# Patient Record
Sex: FEMALE | Race: WHITE | Employment: UNEMPLOYED | URBAN - METROPOLITAN AREA
[De-identification: names, ages, dates, MRNs, and addresses within clinical notes are randomized per-mention and may not be internally consistent; named-entity substitution may affect disease eponyms.]

---

## 2018-07-13 NOTE — LETTER
Date: 9/28/2021    Patient Name: Marylene Cooks          To Whom it may concern: This letter has been written at the patient's request. The above patient was seen at the [facility] for treatment of a medical condition.     Ileana Marie no shortness of breath.

## 2021-09-27 ENCOUNTER — OFFICE VISIT (OUTPATIENT)
Dept: FAMILY MEDICINE CLINIC | Facility: CLINIC | Age: 61
End: 2021-09-27
Payer: COMMERCIAL

## 2021-09-27 VITALS
TEMPERATURE: 99 F | SYSTOLIC BLOOD PRESSURE: 124 MMHG | WEIGHT: 116 LBS | HEIGHT: 65 IN | RESPIRATION RATE: 16 BRPM | BODY MASS INDEX: 19.33 KG/M2 | DIASTOLIC BLOOD PRESSURE: 42 MMHG | HEART RATE: 81 BPM

## 2021-09-27 DIAGNOSIS — K08.9 POOR DENTITION: ICD-10-CM

## 2021-09-27 DIAGNOSIS — K21.00 GASTROESOPHAGEAL REFLUX DISEASE WITH ESOPHAGITIS WITHOUT HEMORRHAGE: Primary | ICD-10-CM

## 2021-09-27 DIAGNOSIS — F33.1 MODERATE EPISODE OF RECURRENT MAJOR DEPRESSIVE DISORDER (HCC): ICD-10-CM

## 2021-09-27 DIAGNOSIS — Z86.010 PERSONAL HISTORY OF COLONIC POLYPS: ICD-10-CM

## 2021-09-27 DIAGNOSIS — F41.1 GENERALIZED ANXIETY DISORDER: ICD-10-CM

## 2021-09-27 PROBLEM — R31.1 BENIGN ESSENTIAL MICROSCOPIC HEMATURIA: Status: ACTIVE | Noted: 2018-02-03

## 2021-09-27 PROBLEM — R63.4 WEIGHT LOSS: Status: ACTIVE | Noted: 2019-10-28

## 2021-09-27 PROBLEM — F33.2 SEVERE EPISODE OF RECURRENT MAJOR DEPRESSIVE DISORDER, WITHOUT PSYCHOTIC FEATURES (HCC): Status: ACTIVE | Noted: 2017-12-30

## 2021-09-27 PROBLEM — J34.89 NASAL OBSTRUCTION: Status: ACTIVE | Noted: 2017-12-16

## 2021-09-27 PROBLEM — J34.2 NASAL SEPTAL DEVIATION: Status: ACTIVE | Noted: 2017-12-16

## 2021-09-27 PROBLEM — M54.2 NECK PAIN: Status: ACTIVE | Noted: 2017-12-16

## 2021-09-27 PROBLEM — M81.6 LOCALIZED OSTEOPOROSIS WITHOUT CURRENT PATHOLOGICAL FRACTURE: Status: ACTIVE | Noted: 2017-12-05

## 2021-09-27 PROCEDURE — 96127 BRIEF EMOTIONAL/BEHAV ASSMT: CPT | Performed by: STUDENT IN AN ORGANIZED HEALTH CARE EDUCATION/TRAINING PROGRAM

## 2021-09-27 PROCEDURE — 99204 OFFICE O/P NEW MOD 45 MIN: CPT | Performed by: STUDENT IN AN ORGANIZED HEALTH CARE EDUCATION/TRAINING PROGRAM

## 2021-09-27 PROCEDURE — 3078F DIAST BP <80 MM HG: CPT | Performed by: STUDENT IN AN ORGANIZED HEALTH CARE EDUCATION/TRAINING PROGRAM

## 2021-09-27 PROCEDURE — 3074F SYST BP LT 130 MM HG: CPT | Performed by: STUDENT IN AN ORGANIZED HEALTH CARE EDUCATION/TRAINING PROGRAM

## 2021-09-27 PROCEDURE — 3008F BODY MASS INDEX DOCD: CPT | Performed by: STUDENT IN AN ORGANIZED HEALTH CARE EDUCATION/TRAINING PROGRAM

## 2021-09-27 RX ORDER — PAROXETINE HYDROCHLORIDE 20 MG/1
20 TABLET, FILM COATED ORAL DAILY
Qty: 30 TABLET | Refills: 0 | Status: SHIPPED | OUTPATIENT
Start: 2021-09-27 | End: 2021-10-27

## 2021-09-27 RX ORDER — FAMOTIDINE 20 MG/1
20 TABLET ORAL 2 TIMES DAILY
Qty: 60 TABLET | Refills: 0 | Status: SHIPPED | OUTPATIENT
Start: 2021-09-27 | End: 2022-01-25

## 2021-09-27 RX ORDER — AMOXICILLIN 500 MG/1
500 CAPSULE ORAL 3 TIMES DAILY
COMMUNITY
Start: 2021-09-23 | End: 2021-10-04 | Stop reason: ALTCHOICE

## 2021-09-27 NOTE — PROGRESS NOTES
[facility] Family Medicine Note    Chief complaint: No chief complaint on file. HPI:   Patient is a 56year old female who presents to establish care and to discuss dental infection, heartburn and depression.     She is currently taking amoxici MAR  Social Hx: smoking on and off  FHx: father had lung cancer  Immunizations: got covid vaccine  Screenings: colonoscopy in 2018, due in 5 years      History reviewed. No pertinent past medical history. History reviewed. No pertinent surgical history. BMI 19.30 kg/m²  Estimated body mass index is 19.3 kg/m² as calculated from the following:    Height as of this encounter: 5' 5\" (1.651 m). Weight as of this encounter: 116 lb (52.6 kg). Vital signs reviewed. Appears stated age, well groomed.   Phys Generalized anxiety disorder  Hx of SHA  - taking lorazepam at nighttime to sleep  - pt would like to stop using lorazepam  - advised to take every other night to being taper  - will follow    5.  Personal history of colonic polyps  - due for colonoscopy in month (around 10/27/2021) for annual physical.    09/27/21  Julissa Clark MD  [address]

## 2021-09-28 PROBLEM — Z86.010 PERSONAL HISTORY OF COLONIC POLYPS: Status: ACTIVE | Noted: 2021-09-28

## 2021-09-28 PROBLEM — K08.9 POOR DENTITION: Status: ACTIVE | Noted: 2021-09-28

## 2021-09-28 PROBLEM — K21.00 GASTROESOPHAGEAL REFLUX DISEASE WITH ESOPHAGITIS WITHOUT HEMORRHAGE: Status: ACTIVE | Noted: 2021-09-28

## 2021-09-28 PROBLEM — Z86.0100 PERSONAL HISTORY OF COLONIC POLYPS: Status: ACTIVE | Noted: 2021-09-28

## 2021-09-29 ENCOUNTER — PATIENT MESSAGE (OUTPATIENT)
Dept: FAMILY MEDICINE CLINIC | Facility: CLINIC | Age: 61
End: 2021-09-29

## 2021-09-29 NOTE — TELEPHONE ENCOUNTER
From: Lady Bonner  To: Katt Chirinos MD  Sent: 9/29/2021 12:15 AM CDT  Subject: Deann Gates,    I was just checking in to see if Donita Tomlinson been able to write a letter for me?  I can forward you the letter I received from th

## 2021-10-04 ENCOUNTER — OFFICE VISIT (OUTPATIENT)
Dept: FAMILY MEDICINE CLINIC | Facility: CLINIC | Age: 61
End: 2021-10-04
Payer: COMMERCIAL

## 2021-10-04 ENCOUNTER — LAB ENCOUNTER (OUTPATIENT)
Dept: LAB | Age: 61
End: 2021-10-04
Attending: STUDENT IN AN ORGANIZED HEALTH CARE EDUCATION/TRAINING PROGRAM
Payer: COMMERCIAL

## 2021-10-04 VITALS
HEIGHT: 65 IN | TEMPERATURE: 97 F | BODY MASS INDEX: 19.33 KG/M2 | WEIGHT: 116 LBS | HEART RATE: 70 BPM | DIASTOLIC BLOOD PRESSURE: 46 MMHG | SYSTOLIC BLOOD PRESSURE: 112 MMHG | RESPIRATION RATE: 16 BRPM

## 2021-10-04 DIAGNOSIS — Z13.220 ENCOUNTER FOR LIPID SCREENING FOR CARDIOVASCULAR DISEASE: ICD-10-CM

## 2021-10-04 DIAGNOSIS — L57.0 ACTINIC KERATOSIS OF MULTIPLE SITES OF HEAD AND NECK: ICD-10-CM

## 2021-10-04 DIAGNOSIS — Z13.6 ENCOUNTER FOR LIPID SCREENING FOR CARDIOVASCULAR DISEASE: ICD-10-CM

## 2021-10-04 DIAGNOSIS — Z12.4 SCREENING FOR MALIGNANT NEOPLASM OF CERVIX: ICD-10-CM

## 2021-10-04 DIAGNOSIS — Z00.01 ENCOUNTER FOR ROUTINE ADULT HEALTH EXAMINATION WITH ABNORMAL FINDINGS: ICD-10-CM

## 2021-10-04 DIAGNOSIS — Z12.2 ENCOUNTER FOR SCREENING FOR LUNG CANCER: ICD-10-CM

## 2021-10-04 DIAGNOSIS — Z13.89 SCREENING FOR GENITOURINARY CONDITION: ICD-10-CM

## 2021-10-04 DIAGNOSIS — K21.00 GASTROESOPHAGEAL REFLUX DISEASE WITH ESOPHAGITIS WITHOUT HEMORRHAGE: ICD-10-CM

## 2021-10-04 DIAGNOSIS — F17.200 NICOTINE DEPENDENCE WITH CURRENT USE: ICD-10-CM

## 2021-10-04 DIAGNOSIS — Z13.820 OSTEOPOROSIS SCREENING: ICD-10-CM

## 2021-10-04 DIAGNOSIS — K08.9 POOR DENTITION: ICD-10-CM

## 2021-10-04 DIAGNOSIS — Z12.31 ENCOUNTER FOR SCREENING MAMMOGRAM FOR MALIGNANT NEOPLASM OF BREAST: ICD-10-CM

## 2021-10-04 DIAGNOSIS — Z00.01 ENCOUNTER FOR ROUTINE ADULT HEALTH EXAMINATION WITH ABNORMAL FINDINGS: Primary | ICD-10-CM

## 2021-10-04 PROCEDURE — 99406 BEHAV CHNG SMOKING 3-10 MIN: CPT | Performed by: STUDENT IN AN ORGANIZED HEALTH CARE EDUCATION/TRAINING PROGRAM

## 2021-10-04 PROCEDURE — 88175 CYTOPATH C/V AUTO FLUID REDO: CPT | Performed by: STUDENT IN AN ORGANIZED HEALTH CARE EDUCATION/TRAINING PROGRAM

## 2021-10-04 PROCEDURE — 81001 URINALYSIS AUTO W/SCOPE: CPT | Performed by: STUDENT IN AN ORGANIZED HEALTH CARE EDUCATION/TRAINING PROGRAM

## 2021-10-04 PROCEDURE — 3074F SYST BP LT 130 MM HG: CPT | Performed by: STUDENT IN AN ORGANIZED HEALTH CARE EDUCATION/TRAINING PROGRAM

## 2021-10-04 PROCEDURE — 80050 GENERAL HEALTH PANEL: CPT | Performed by: STUDENT IN AN ORGANIZED HEALTH CARE EDUCATION/TRAINING PROGRAM

## 2021-10-04 PROCEDURE — 99396 PREV VISIT EST AGE 40-64: CPT | Performed by: STUDENT IN AN ORGANIZED HEALTH CARE EDUCATION/TRAINING PROGRAM

## 2021-10-04 PROCEDURE — 87624 HPV HI-RISK TYP POOLED RSLT: CPT | Performed by: STUDENT IN AN ORGANIZED HEALTH CARE EDUCATION/TRAINING PROGRAM

## 2021-10-04 PROCEDURE — 80061 LIPID PANEL: CPT | Performed by: STUDENT IN AN ORGANIZED HEALTH CARE EDUCATION/TRAINING PROGRAM

## 2021-10-04 PROCEDURE — 84439 ASSAY OF FREE THYROXINE: CPT | Performed by: STUDENT IN AN ORGANIZED HEALTH CARE EDUCATION/TRAINING PROGRAM

## 2021-10-04 PROCEDURE — 3078F DIAST BP <80 MM HG: CPT | Performed by: STUDENT IN AN ORGANIZED HEALTH CARE EDUCATION/TRAINING PROGRAM

## 2021-10-04 PROCEDURE — 87625 HPV TYPES 16 & 18 ONLY: CPT | Performed by: STUDENT IN AN ORGANIZED HEALTH CARE EDUCATION/TRAINING PROGRAM

## 2021-10-04 PROCEDURE — 3008F BODY MASS INDEX DOCD: CPT | Performed by: STUDENT IN AN ORGANIZED HEALTH CARE EDUCATION/TRAINING PROGRAM

## 2021-10-04 NOTE — PATIENT INSTRUCTIONS
Tips to Control Acid Reflux    To control acid reflux, you’ll need to make some basic diet and lifestyle changes. The simple steps outlined below may be all you’ll need to ease discomfort. Watch what you eat  · Don't have fatty foods or spicy foods.   · support:  · National tobacco quitline: 1-800-QUIT-NOW (5-225.165.1114). · SmokefreeTXT is a free text program to assist you in quitting. Visit http://[employer]/ for more information.   · Feel free to call your care manager at ([phone]

## 2021-10-04 NOTE — PROGRESS NOTES
CC: Annual Physical Exam    HPI:   Fuentes Sandoval is a 56year old female who presents for a complete physical exam. Symptoms: is menopausal.     Patient complains of wart on finger of R hand that she would like removed.  She also has concerns about hand Used    Vaping Use      Vaping Use: Never used    Alcohol use: Not Currently    Drug use: Not Currently    Exercise: inconsistently.   Diet: general       REVIEW OF SYSTEMS:   GENERAL: feels well otherwise, no fevers  SKIN: see HPI  EYES:denies vision reyes products. Assess: We asked about/assessed behavioral health risk and factors affecting choice of behavior change goals/methods. Specifically I asked about readiness to quit tobacco. She is currently not ready to quit. Advise:  We gave clear, specific, an coverage prior to doing the test.     Pt due for colonoscopy in 2023. (Most recent was 2018). Recommend zoster vaccine. Discussed implications of shingles such as blindness, hearing loss, chronic pain. Pt' s weight is Body mass index is 19.3 kg/m². ,

## 2021-10-13 ENCOUNTER — TELEPHONE (OUTPATIENT)
Dept: FAMILY MEDICINE CLINIC | Facility: CLINIC | Age: 61
End: 2021-10-13

## 2021-10-13 ENCOUNTER — PATIENT MESSAGE (OUTPATIENT)
Dept: FAMILY MEDICINE CLINIC | Facility: CLINIC | Age: 61
End: 2021-10-13

## 2021-10-13 DIAGNOSIS — R87.619 ATYPICAL GLANDULAR CELLS ON CERVICAL PAP SMEAR: Primary | ICD-10-CM

## 2021-10-13 DIAGNOSIS — R87.810 CERVICAL HIGH RISK HPV (HUMAN PAPILLOMAVIRUS) TEST POSITIVE: ICD-10-CM

## 2021-10-13 NOTE — TELEPHONE ENCOUNTER
Barrie Mendoza  [facility] [address] [facility] [facility] Office  Good afternoon,     The CT Low dose Cpt code is 52800. Monique Sanabria advise if you would like to change the code to that code?        Lidia Sanchez     CT CHEST LD NO CAD(CPT=71250)

## 2021-10-13 NOTE — TELEPHONE ENCOUNTER
From: Rojelio Landau  To: Alphonso Perea MD  Sent: 10/13/2021 10:19 AM CDT  Subject: OB/GYN    Good Morning,    Based on my Pap Smear results, Dr Chauncey Dumont stated I needed to see an Ob/Gyn doctor.  I do not have one so would like her jannette

## 2021-10-27 ENCOUNTER — TELEPHONE (OUTPATIENT)
Dept: OBGYN CLINIC | Facility: CLINIC | Age: 61
End: 2021-10-27

## 2021-10-27 NOTE — TELEPHONE ENCOUNTER
Pt was referred to us for an abnormal pap    Please review and advise what type of appt PT should have    Pt already made an appt-May need to CHANGE    Future Appointments   Date Time Provider Kaela Vera   11/1/2021 10:20 AM [address] [facility]

## 2021-10-28 NOTE — TELEPHONE ENCOUNTER
Pap with atypical glandular cells and HPV + pap. Will need to reschedule appt that patient made via [email or web address]. Call to patient; no answer. Left message to call back.

## 2021-10-28 NOTE — TELEPHONE ENCOUNTER
Ivett Franz MD  [facility] [place] Nurse  Caller: Unspecified (Yesterday,  8:17 AM)  Needs EMB and pap so if new to us I would try to do a 45 min appt

## 2021-10-29 NOTE — TELEPHONE ENCOUNTER
Contacted patient. Reviewed results and recommendations for EMB and colposcopy. Patient states she has had both of these procedures before. No questions at this time.      Transferred to [facility] staff to assist with rescheduling for noncall day and for 45 min s

## 2021-11-01 ENCOUNTER — HOSPITAL ENCOUNTER (OUTPATIENT)
Dept: BONE DENSITY | Age: 61
Discharge: HOME | End: 2021-11-01
Attending: STUDENT IN AN ORGANIZED HEALTH CARE EDUCATION/TRAINING PROGRAM
Payer: COMMERCIAL

## 2021-11-01 ENCOUNTER — HOSPITAL ENCOUNTER (OUTPATIENT)
Dept: MAMMOGRAPHY | Age: 61
Discharge: HOME | End: 2021-11-01
Attending: STUDENT IN AN ORGANIZED HEALTH CARE EDUCATION/TRAINING PROGRAM
Payer: COMMERCIAL

## 2021-11-01 DIAGNOSIS — Z12.31 ENCOUNTER FOR SCREENING MAMMOGRAM FOR MALIGNANT NEOPLASM OF BREAST: ICD-10-CM

## 2021-11-01 DIAGNOSIS — Z00.01 ENCOUNTER FOR ROUTINE ADULT HEALTH EXAMINATION WITH ABNORMAL FINDINGS: ICD-10-CM

## 2021-11-01 DIAGNOSIS — Z13.820 OSTEOPOROSIS SCREENING: ICD-10-CM

## 2021-11-01 PROCEDURE — 77067 SCR MAMMO BI INCL CAD: CPT | Performed by: STUDENT IN AN ORGANIZED HEALTH CARE EDUCATION/TRAINING PROGRAM

## 2021-11-01 PROCEDURE — 77063 BREAST TOMOSYNTHESIS BI: CPT | Performed by: STUDENT IN AN ORGANIZED HEALTH CARE EDUCATION/TRAINING PROGRAM

## 2021-11-01 PROCEDURE — 77080 DXA BONE DENSITY AXIAL: CPT | Performed by: STUDENT IN AN ORGANIZED HEALTH CARE EDUCATION/TRAINING PROGRAM

## 2021-11-15 ENCOUNTER — HOSPITAL ENCOUNTER (OUTPATIENT)
Dept: CT IMAGING | Facility: HOSPITAL | Age: 61
Discharge: HOME | End: 2021-11-15
Attending: STUDENT IN AN ORGANIZED HEALTH CARE EDUCATION/TRAINING PROGRAM
Payer: COMMERCIAL

## 2021-11-15 DIAGNOSIS — Z12.2 ENCOUNTER FOR SCREENING FOR LUNG CANCER: ICD-10-CM

## 2021-11-15 DIAGNOSIS — F17.200 NICOTINE DEPENDENCE WITH CURRENT USE: ICD-10-CM

## 2021-11-15 PROCEDURE — 71250 CT THORAX DX C-: CPT | Performed by: STUDENT IN AN ORGANIZED HEALTH CARE EDUCATION/TRAINING PROGRAM

## 2021-11-16 ENCOUNTER — PATIENT MESSAGE (OUTPATIENT)
Dept: FAMILY MEDICINE CLINIC | Facility: CLINIC | Age: 61
End: 2021-11-16

## 2021-11-17 NOTE — TELEPHONE ENCOUNTER
From: Keila Patterson  To: Bernard Lewis MD  Sent: 11/16/2021 5:08 PM CST  Subject: Question regarding CT Scan Chest    What does having small nodules mean?

## 2021-11-22 ENCOUNTER — OFFICE VISIT (OUTPATIENT)
Dept: FAMILY MEDICINE CLINIC | Facility: CLINIC | Age: 61
End: 2021-11-22
Payer: COMMERCIAL

## 2021-11-22 ENCOUNTER — LAB ENCOUNTER (OUTPATIENT)
Dept: LAB | Age: 61
End: 2021-11-22
Attending: STUDENT IN AN ORGANIZED HEALTH CARE EDUCATION/TRAINING PROGRAM
Payer: COMMERCIAL

## 2021-11-22 VITALS — WEIGHT: 120 LBS | HEIGHT: 65 IN | BODY MASS INDEX: 19.99 KG/M2

## 2021-11-22 DIAGNOSIS — Z20.822 ENCOUNTER FOR PREPROCEDURE SCREENING LABORATORY TESTING FOR COVID-19: ICD-10-CM

## 2021-11-22 DIAGNOSIS — R87.619 ATYPICAL GLANDULAR CELLS ON CERVICAL PAP SMEAR: ICD-10-CM

## 2021-11-22 DIAGNOSIS — M85.851 OSTEOPENIA OF BOTH HIPS: ICD-10-CM

## 2021-11-22 DIAGNOSIS — M85.88 OSTEOPENIA OF LUMBAR SPINE: ICD-10-CM

## 2021-11-22 DIAGNOSIS — J43.2 CENTRILOBULAR EMPHYSEMA (HCC): ICD-10-CM

## 2021-11-22 DIAGNOSIS — F41.9 ANXIETY AND DEPRESSION: ICD-10-CM

## 2021-11-22 DIAGNOSIS — M81.6 LOCALIZED OSTEOPOROSIS WITHOUT CURRENT PATHOLOGICAL FRACTURE: Primary | ICD-10-CM

## 2021-11-22 DIAGNOSIS — M81.0 OSTEOPOROSIS OF FEMUR WITHOUT PATHOLOGICAL FRACTURE: ICD-10-CM

## 2021-11-22 DIAGNOSIS — M85.852 OSTEOPENIA OF BOTH HIPS: ICD-10-CM

## 2021-11-22 DIAGNOSIS — F32.A ANXIETY AND DEPRESSION: ICD-10-CM

## 2021-11-22 DIAGNOSIS — Z01.812 ENCOUNTER FOR PREPROCEDURE SCREENING LABORATORY TESTING FOR COVID-19: ICD-10-CM

## 2021-11-22 PROCEDURE — 99214 OFFICE O/P EST MOD 30 MIN: CPT | Performed by: STUDENT IN AN ORGANIZED HEALTH CARE EDUCATION/TRAINING PROGRAM

## 2021-11-22 PROCEDURE — 82306 VITAMIN D 25 HYDROXY: CPT | Performed by: STUDENT IN AN ORGANIZED HEALTH CARE EDUCATION/TRAINING PROGRAM

## 2021-11-22 PROCEDURE — 3008F BODY MASS INDEX DOCD: CPT | Performed by: STUDENT IN AN ORGANIZED HEALTH CARE EDUCATION/TRAINING PROGRAM

## 2021-11-22 RX ORDER — PAROXETINE HYDROCHLORIDE 20 MG/1
20 TABLET, FILM COATED ORAL EVERY MORNING
Qty: 90 TABLET | Refills: 0 | Status: SHIPPED | OUTPATIENT
Start: 2021-11-22 | End: 2022-02-20

## 2021-11-22 RX ORDER — PAROXETINE HYDROCHLORIDE 20 MG/1
20 TABLET, FILM COATED ORAL EVERY MORNING
COMMUNITY
End: 2021-11-22

## 2021-11-22 RX ORDER — FLUTICASONE PROPIONATE AND SALMETEROL 100; 50 UG/1; UG/1
1 POWDER RESPIRATORY (INHALATION) 2 TIMES DAILY
Qty: 14 EACH | Refills: 0 | Status: SHIPPED | OUTPATIENT
Start: 2021-11-22 | End: 2021-12-27

## 2021-11-22 RX ORDER — ALENDRONATE SODIUM 10 MG/1
10 TABLET ORAL
Qty: 90 TABLET | Refills: 3 | Status: SHIPPED | OUTPATIENT
Start: 2021-11-22 | End: 2022-11-17

## 2021-11-22 NOTE — PROGRESS NOTES
[facility] Family Medicine Note    Chief complaint: Patient presents with:  Test Results    HPI:   Patient is a 56year old female with a PMH of  has no past medical history on file. who presents for follow up of test results.     Had LDCT for [facility] pain or edema. RESPIRATORY:  Denies shortness of breath  GASTROINTESTINAL: +heartburn.  Denies abdominal pain  NEUROLOGICAL:  Denies headache, dizziness  MUSCULOSKELETAL:  Denies muscle pain  ALLERGIES: +hx allergies      EXAM:   BP (P) 100/62 (BP Location Take 1 tablet (10 mg total) by mouth every morning before breakfast.  Dispense: 90 tablet; Refill: 3    4.  Anxiety and depression  Patient doing well on paroxetine  - patient stopped lorazepam \"cold turkey\" and is doing well now  - will continue paroxeti Problem List:  Patient Active Problem List:     Weight loss     Severe episode of recurrent major depressive disorder, without psychotic features (HCC)     Selective IgA immunodeficiency (HCC)     Neck pain     Nasal septal deviation     Nasal obstruct

## 2021-12-10 ENCOUNTER — OFFICE VISIT (OUTPATIENT)
Dept: OBGYN CLINIC | Facility: CLINIC | Age: 61
End: 2021-12-10
Payer: COMMERCIAL

## 2021-12-10 VITALS
BODY MASS INDEX: 20.08 KG/M2 | DIASTOLIC BLOOD PRESSURE: 70 MMHG | WEIGHT: 122 LBS | HEIGHT: 65.5 IN | SYSTOLIC BLOOD PRESSURE: 110 MMHG

## 2021-12-10 DIAGNOSIS — R87.619 ABNORMAL GLANDULAR PAPANICOLAOU SMEAR OF CERVIX: Primary | ICD-10-CM

## 2021-12-10 PROCEDURE — 3074F SYST BP LT 130 MM HG: CPT | Performed by: OBSTETRICS & GYNECOLOGY

## 2021-12-10 PROCEDURE — 88305 TISSUE EXAM BY PATHOLOGIST: CPT | Performed by: OBSTETRICS & GYNECOLOGY

## 2021-12-10 PROCEDURE — 58110 BX DONE W/COLPOSCOPY ADD-ON: CPT | Performed by: OBSTETRICS & GYNECOLOGY

## 2021-12-10 PROCEDURE — 88342 IMHCHEM/IMCYTCHM 1ST ANTB: CPT | Performed by: OBSTETRICS & GYNECOLOGY

## 2021-12-10 PROCEDURE — 57454 BX/CURETT OF CERVIX W/SCOPE: CPT | Performed by: OBSTETRICS & GYNECOLOGY

## 2021-12-10 PROCEDURE — 3078F DIAST BP <80 MM HG: CPT | Performed by: OBSTETRICS & GYNECOLOGY

## 2021-12-10 PROCEDURE — 3008F BODY MASS INDEX DOCD: CPT | Performed by: OBSTETRICS & GYNECOLOGY

## 2021-12-10 NOTE — PROGRESS NOTES
Colposcopy Procedure Note    Date of Procedure: 12/10/21    Indications: 59year old y/o female with MARISABEL and +HR HPV (not 16/18/45). No LMP recorded. (Menstrual status: Menopause).    Thinks she has had one or both of these procedures in the past. Denies a

## 2021-12-15 DIAGNOSIS — R87.619 ABNORMAL GLANDULAR PAPANICOLAOU SMEAR OF CERVIX: Primary | ICD-10-CM

## 2021-12-24 DIAGNOSIS — J43.2 CENTRILOBULAR EMPHYSEMA (HCC): ICD-10-CM

## 2021-12-27 RX ORDER — FLUTICASONE PROPIONATE AND SALMETEROL 50; 100 UG/1; UG/1
POWDER RESPIRATORY (INHALATION)
Qty: 60 EACH | Refills: 0 | Status: SHIPPED | OUTPATIENT
Start: 2021-12-27 | End: 2022-01-27

## 2022-01-11 ENCOUNTER — HOSPITAL ENCOUNTER (OUTPATIENT)
Dept: ULTRASOUND IMAGING | Age: 62
Discharge: HOME | End: 2022-01-11
Attending: OBSTETRICS & GYNECOLOGY
Payer: COMMERCIAL

## 2022-01-11 DIAGNOSIS — R87.619 ABNORMAL GLANDULAR PAPANICOLAOU SMEAR OF CERVIX: ICD-10-CM

## 2022-01-11 PROCEDURE — 76856 US EXAM PELVIC COMPLETE: CPT | Performed by: OBSTETRICS & GYNECOLOGY

## 2022-01-11 PROCEDURE — 76830 TRANSVAGINAL US NON-OB: CPT | Performed by: OBSTETRICS & GYNECOLOGY

## 2022-01-24 DIAGNOSIS — J43.2 CENTRILOBULAR EMPHYSEMA (HCC): ICD-10-CM

## 2022-01-25 DIAGNOSIS — K21.00 GASTROESOPHAGEAL REFLUX DISEASE WITH ESOPHAGITIS WITHOUT HEMORRHAGE: ICD-10-CM

## 2022-01-25 RX ORDER — FAMOTIDINE 20 MG/1
TABLET ORAL
Qty: 180 TABLET | Refills: 0 | Status: SHIPPED | OUTPATIENT
Start: 2022-01-25

## 2022-01-27 RX ORDER — FLUTICASONE PROPIONATE AND SALMETEROL 50; 100 UG/1; UG/1
POWDER RESPIRATORY (INHALATION)
Qty: 60 EACH | Refills: 0 | Status: SHIPPED | OUTPATIENT
Start: 2022-01-27

## 2022-01-27 NOTE — TELEPHONE ENCOUNTER
LOV: 11/22/2021    NOV: none scheduled     LF: 12/27/2021--for dx of Centrilobular emphysema   Comment on this rx sts \"Pt does not have asthma listed on problem list.

## 2022-02-24 RX ORDER — PAROXETINE HYDROCHLORIDE 20 MG/1
TABLET, FILM COATED ORAL
Qty: 90 TABLET | Refills: 0 | Status: SHIPPED | OUTPATIENT
Start: 2022-02-24

## 2022-04-04 ENCOUNTER — PATIENT MESSAGE (OUTPATIENT)
Dept: FAMILY MEDICINE CLINIC | Facility: CLINIC | Age: 62
End: 2022-04-04

## 2022-07-03 DIAGNOSIS — F32.A ANXIETY AND DEPRESSION: ICD-10-CM

## 2022-07-03 DIAGNOSIS — F41.9 ANXIETY AND DEPRESSION: ICD-10-CM

## 2022-07-05 RX ORDER — PAROXETINE HYDROCHLORIDE 20 MG/1
TABLET, FILM COATED ORAL
Qty: 30 TABLET | Refills: 0 | Status: SHIPPED | OUTPATIENT
Start: 2022-07-05

## 2022-07-29 DIAGNOSIS — K21.00 GASTROESOPHAGEAL REFLUX DISEASE WITH ESOPHAGITIS WITHOUT HEMORRHAGE: ICD-10-CM

## 2022-07-29 RX ORDER — FAMOTIDINE 20 MG/1
20 TABLET, FILM COATED ORAL 2 TIMES DAILY
Qty: 180 TABLET | Refills: 0 | Status: SHIPPED | OUTPATIENT
Start: 2022-07-29

## 2023-09-14 DIAGNOSIS — F41.1 GENERALIZED ANXIETY DISORDER: ICD-10-CM

## 2023-09-14 DIAGNOSIS — F32.2 SEVERE DEPRESSION (HCC): ICD-10-CM

## 2023-09-21 RX ORDER — FLUOXETINE 10 MG/1
CAPSULE ORAL
Qty: 173 CAPSULE | Refills: 0 | OUTPATIENT
Start: 2023-09-21

## 2024-01-09 ENCOUNTER — HOSPITAL ENCOUNTER (EMERGENCY)
Facility: HOSPITAL | Age: 64
Discharge: LEFT BEFORE DISCHARGE | End: 2024-01-09
Attending: EMERGENCY MEDICINE
Payer: COMMERCIAL

## 2024-01-09 ENCOUNTER — APPOINTMENT (OUTPATIENT)
Dept: CT IMAGING | Facility: HOSPITAL | Age: 64
End: 2024-01-09
Attending: EMERGENCY MEDICINE
Payer: COMMERCIAL

## 2024-01-09 ENCOUNTER — APPOINTMENT (OUTPATIENT)
Dept: ULTRASOUND IMAGING | Facility: HOSPITAL | Age: 64
End: 2024-01-09
Attending: EMERGENCY MEDICINE
Payer: COMMERCIAL

## 2024-01-09 VITALS
OXYGEN SATURATION: 99 % | DIASTOLIC BLOOD PRESSURE: 80 MMHG | HEART RATE: 67 BPM | RESPIRATION RATE: 16 BRPM | TEMPERATURE: 98 F | SYSTOLIC BLOOD PRESSURE: 105 MMHG

## 2024-01-09 DIAGNOSIS — R10.2 PELVIC PAIN: Primary | ICD-10-CM

## 2024-01-09 LAB
ALBUMIN SERPL-MCNC: 3.9 G/DL (ref 3.4–5)
ALBUMIN/GLOB SERPL: 1.1 {RATIO} (ref 1–2)
ALP LIVER SERPL-CCNC: 94 U/L
ALT SERPL-CCNC: 13 U/L
ANION GAP SERPL CALC-SCNC: 3 MMOL/L (ref 0–18)
AST SERPL-CCNC: 12 U/L (ref 15–37)
BASOPHILS # BLD AUTO: 0.05 X10(3) UL (ref 0–0.2)
BASOPHILS NFR BLD AUTO: 0.6 %
BILIRUB SERPL-MCNC: 0.7 MG/DL (ref 0.1–2)
BILIRUB UR QL STRIP.AUTO: NEGATIVE
BUN BLD-MCNC: 3 MG/DL (ref 9–23)
CALCIUM BLD-MCNC: 9.1 MG/DL (ref 8.5–10.1)
CHLORIDE SERPL-SCNC: 109 MMOL/L (ref 98–112)
CLARITY UR REFRACT.AUTO: CLEAR
CO2 SERPL-SCNC: 29 MMOL/L (ref 21–32)
COLOR UR AUTO: YELLOW
CREAT BLD-MCNC: 0.73 MG/DL
EGFRCR SERPLBLD CKD-EPI 2021: 91 ML/MIN/1.73M2 (ref 60–?)
EOSINOPHIL # BLD AUTO: 0.04 X10(3) UL (ref 0–0.7)
EOSINOPHIL NFR BLD AUTO: 0.5 %
ERYTHROCYTE [DISTWIDTH] IN BLOOD BY AUTOMATED COUNT: 12.1 %
GLOBULIN PLAS-MCNC: 3.7 G/DL (ref 2.8–4.4)
GLUCOSE BLD-MCNC: 103 MG/DL (ref 70–99)
GLUCOSE UR STRIP.AUTO-MCNC: NORMAL MG/DL
HCT VFR BLD AUTO: 40.1 %
HGB BLD-MCNC: 14 G/DL
IMM GRANULOCYTES # BLD AUTO: 0.02 X10(3) UL (ref 0–1)
IMM GRANULOCYTES NFR BLD: 0.2 %
KETONES UR STRIP.AUTO-MCNC: NEGATIVE MG/DL
LEUKOCYTE ESTERASE UR QL STRIP.AUTO: NEGATIVE
LIPASE SERPL-CCNC: 25 U/L (ref 13–75)
LYMPHOCYTES # BLD AUTO: 2.41 X10(3) UL (ref 1–4)
LYMPHOCYTES NFR BLD AUTO: 28.7 %
MCH RBC QN AUTO: 32.3 PG (ref 26–34)
MCHC RBC AUTO-ENTMCNC: 34.9 G/DL (ref 31–37)
MCV RBC AUTO: 92.4 FL
MONOCYTES # BLD AUTO: 0.43 X10(3) UL (ref 0.1–1)
MONOCYTES NFR BLD AUTO: 5.1 %
NEUTROPHILS # BLD AUTO: 5.45 X10 (3) UL (ref 1.5–7.7)
NEUTROPHILS # BLD AUTO: 5.45 X10(3) UL (ref 1.5–7.7)
NEUTROPHILS NFR BLD AUTO: 64.9 %
NITRITE UR QL STRIP.AUTO: NEGATIVE
OSMOLALITY SERPL CALC.SUM OF ELEC: 289 MOSM/KG (ref 275–295)
PH UR STRIP.AUTO: 5.5 [PH] (ref 5–8)
PLATELET # BLD AUTO: 248 10(3)UL (ref 150–450)
POTASSIUM SERPL-SCNC: 3.6 MMOL/L (ref 3.5–5.1)
PROT SERPL-MCNC: 7.6 G/DL (ref 6.4–8.2)
PROT UR STRIP.AUTO-MCNC: NEGATIVE MG/DL
RBC # BLD AUTO: 4.34 X10(6)UL
RBC #/AREA URNS AUTO: >10 /HPF
SODIUM SERPL-SCNC: 141 MMOL/L (ref 136–145)
SP GR UR STRIP.AUTO: 1.02 (ref 1–1.03)
UROBILINOGEN UR STRIP.AUTO-MCNC: NORMAL MG/DL
WBC # BLD AUTO: 8.4 X10(3) UL (ref 4–11)

## 2024-01-09 PROCEDURE — 74176 CT ABD & PELVIS W/O CONTRAST: CPT | Performed by: EMERGENCY MEDICINE

## 2024-01-09 PROCEDURE — 80053 COMPREHEN METABOLIC PANEL: CPT | Performed by: EMERGENCY MEDICINE

## 2024-01-09 PROCEDURE — 81001 URINALYSIS AUTO W/SCOPE: CPT | Performed by: EMERGENCY MEDICINE

## 2024-01-09 PROCEDURE — 85025 COMPLETE CBC W/AUTO DIFF WBC: CPT | Performed by: EMERGENCY MEDICINE

## 2024-01-09 PROCEDURE — 76856 US EXAM PELVIC COMPLETE: CPT | Performed by: EMERGENCY MEDICINE

## 2024-01-09 PROCEDURE — 83690 ASSAY OF LIPASE: CPT | Performed by: EMERGENCY MEDICINE

## 2024-01-09 PROCEDURE — 99285 EMERGENCY DEPT VISIT HI MDM: CPT

## 2024-01-09 PROCEDURE — 81001 URINALYSIS AUTO W/SCOPE: CPT

## 2024-01-09 PROCEDURE — 83690 ASSAY OF LIPASE: CPT

## 2024-01-09 PROCEDURE — 80053 COMPREHEN METABOLIC PANEL: CPT

## 2024-01-09 PROCEDURE — 36415 COLL VENOUS BLD VENIPUNCTURE: CPT

## 2024-01-09 PROCEDURE — 76830 TRANSVAGINAL US NON-OB: CPT | Performed by: EMERGENCY MEDICINE

## 2024-01-09 PROCEDURE — 85025 COMPLETE CBC W/AUTO DIFF WBC: CPT

## 2024-01-09 RX ORDER — LORAZEPAM 0.5 MG/1
0.5 TABLET ORAL EVERY 4 HOURS PRN
COMMUNITY

## 2024-01-09 NOTE — ED INITIAL ASSESSMENT (HPI)
Patient reports c/o left sided abdominal/pelvic pain, urinary frequency, and upper left thigh. Patient rates pain 5 out of 10 on pain scale.

## 2024-01-09 NOTE — ED PROVIDER NOTES
Patient Seen in: [facility] Emergency Department      History     Chief Complaint   Patient presents with    Abdomen/Flank Pain     Stated Complaint: abd pain    Subjective:   HPI    Left lower quadrant abdominal pain and going down into pelvis  Nausea  Feels and looks swollen on left in morning   Pain into left hip/ left pubis especiall;y with walking      Objective:   Past Medical History:   Diagnosis Date    Celiac disease     GERD (gastroesophageal reflux disease)               Past Surgical History:   Procedure Laterality Date    CHOLECYSTECTOMY      COLPOSCOPY, CERVIX W/UPPER ADJACENT VAGINA; W/BIOPSY(S), CERVIX      CYST REMOVAL      LEEP      OTHER SURGICAL HISTORY      ACL    OTHER SURGICAL HISTORY      minscus repair                 Social History     Socioeconomic History    Marital status: Single   Tobacco Use    Smoking status: Every Day     Types: Cigarettes     Start date: 9/27/2016    Smokeless tobacco: Never   Vaping Use    Vaping Use: Never used   Substance and Sexual Activity    Alcohol use: Not Currently    Drug use: Not Currently    Sexual activity: Not Currently   Other Topics Concern    Caffeine Concern Yes     Comment: 1 soda daily    Exercise No    Seat Belt Yes              Review of Systems    Positive for stated complaint: abd pain  Other systems are as noted in HPI.  Constitutional and vital signs reviewed.      All other systems reviewed and negative except as noted above.    Physical Exam     ED Triage Vitals [01/09/24 1406]   /88   Pulse 88   Resp 17   Temp 97.8 °F (36.6 °C)   Temp src Temporal   SpO2 95 %   O2 Device None (Room air)       Current:/88   Pulse 88   Temp 97.8 °F (36.6 °C) (Temporal)   Resp 17   SpO2 95%         Physical Exam    ***      ED Course     Labs Reviewed   COMP METABOLIC PANEL (14) - Abnormal; Notable for the following components:       Result Value    Glucose 103 (*)     BUN 3 (*)     AST 12 (*)     All other components within normal  limits   URINALYSIS WITH CULTURE REFLEX - Abnormal; Notable for the following components:    Blood Urine 2+ (*)     RBC Urine >10 (*)     Squamous Epi. Cells Few (*)     All other components within normal limits   LIPASE - Normal   CBC WITH DIFFERENTIAL WITH PLATELET    Narrative:     The following orders were created for panel order CBC With Differential With Platelet.  Procedure                               Abnormality         Status                     ---------                               -----------         ------                     CBC W/ DIFFERENTIAL[[id number]]                              Final result                 Please view results for these tests on the individual orders.   CBC W/ DIFFERENTIAL             ***         MDM      ***                                   MDM    Disposition and Plan     Clinical Impression:  No diagnosis found.     Disposition:  There is no disposition on file for this visit.  There is no disposition time on file for this visit.    Follow-up:  No follow-up provider specified.        Medications Prescribed:  Current Discharge Medication List                                            measures up to 1.3 x 0.8 x 2.0 cm.  The ovaries are within normal limits.     Normal flow is noted within the bilateral ovaries.                             =====   CONCLUSION:  Normal ultrasound of the pelvis.           LOCATION:  [id number]           Dictated by (CST): Jose R Rivera MD on 1/09/2024 at 7:36 PM        Finalized by (CST): Jose R Rivera MD on 1/09/2024 at 7:37 PM         CT ABDOMEN+PELVIS KIDNEYSTONE 2D RNDR(NO IV,NO ORAL)(CPT=74176)   Final Result   PROCEDURE:  CT ABDOMEN+PELVIS KIDNEYSTONE 2D RNDR(NO IV,NO    ORAL)(CPT=74176)       COMPARISON:  None.       INDICATIONS:  Left-sided abdominal pain and pelvic pain.  Urinary    frequency.       TECHNIQUE:  Unenhanced multislice CT scanning from above the kidneys to    below the urinary bladder.  2D rendering are generated on the CT scanner    workstation to localize potential stones in the cranio-caudal plane.  Dose    reduction techniques were used.    Dose information is transmitted to the ACR (American College of Radiology)    NRDR (National Radiology Data Registry) which includes the Dose Index    Registry.                FINDINGS:     KIDNEYS:  No mass, obstruction, or calcification.    BLADDER:  Status post cholecystectomy.  No mass, calculus or significant    wall thickening.    ADRENALS:  No mass or enlargement.     LIVER:  No enlargement, atrophy, abnormal density, or significant focal    lesion.     BILIARY:  No visible dilatation or calcification.     PANCREAS:  No lesion, fluid collection, ductal dilatation, or atrophy.     SPLEEN:  No enlargement or focal lesion.     AORTA/VASCULAR:  No aneurysm.  Mild atherosclerotic disease of the aorta.   RETROPERITONEUM:  No mass or adenopathy.     BOWEL/MESENTERY:  No visible mass, obstruction, or bowel wall thickening.     The appendix is within normal limits.  There is a moderate amount of stool    throughout the colon can be seen in constipation.   ABDOMINAL WALL:  No mass or hernia.     BONES:  No  bony lesion or fracture.  Mild degenerative changes of the    spine.   PELVIC ORGANS:  Normal for age.     LUNG BASES:  No visible pulmonary or pleural disease.     OTHER:  Negative.                           =====   CONCLUSION:  No evidence of acute abdominal pelvic process.               LOCATION:  [facility]           Dictated by (CST): Herman Whitney MD on 1/09/2024 at 4:30 PM        Finalized by (CST): Herman Whitney MD on 1/09/2024 at 4:39 PM                         MDM        63-year-old patient presents to the emergency department with a left lower abdominal/pelvic pain of multiple weeks duration.  It is on presentation.  Differential diagnosis would include pelvic mass, ovarian tumor, constipation, diverticulitis, hernia, neoplastic process, and discussed with the patient that it is an odd presentation.  Patient really does not endorse a significant amount of constipation but notes that she can feel a mass down there.  Given that, my concern increased.  Review of the patient's labs to rule out any significant leukocytosis signs of infection normal electrolytes or even on a urine for the possible ability of pyelonephritis or kidney stones causing the left abdominal pain.  Patient does have red blood cells in her urine and 2+ blood she notes that in the past she has had blood in her urine.  This could possibly be a ureteral stone.  CT was done to rule out ureteral stone or any obvious masses and that is actually really quite benign so that was reassuring and then a pelvic ultrasound was done to rule out neoplastic processes of the adnexa or the ovaries which would not be well-seen on and on questioning contrasted CT of the abdomen pelvis.  That was also a really very reassuring study.  From a workup standpoint this patient does not have any significant signs of masses, acute illness though I cannot completely rule out bony abnormality or even pinched nerve that could be causing her this left adnexal pain  particularly some sort of lumbar radiculopathy.  That of course is a much better diagnosis of then we were evaluating for here.  Patient was reassured she really is stable for discharge home to follow-up with her primary care physician                                   Medical Decision Making      Disposition and Plan     Clinical Impression:  1. Pelvic pain         Disposition:  Eloped  1/9/2024  7:49 pm    Follow-up:  No follow-up provider specified.        Medications Prescribed:  Discharge Medication List as of 1/9/2024  7:59 PM

## 2024-01-29 DIAGNOSIS — M81.6 LOCALIZED OSTEOPOROSIS WITHOUT CURRENT PATHOLOGICAL FRACTURE: ICD-10-CM

## 2024-01-29 DIAGNOSIS — K21.00 GASTROESOPHAGEAL REFLUX DISEASE WITH ESOPHAGITIS WITHOUT HEMORRHAGE: ICD-10-CM

## 2024-01-30 RX ORDER — ALENDRONATE SODIUM 10 MG/1
10 TABLET ORAL DAILY
Qty: 30 TABLET | Refills: 0 | OUTPATIENT
Start: 2024-01-30

## 2024-01-30 RX ORDER — FAMOTIDINE 20 MG/1
20 TABLET, FILM COATED ORAL 2 TIMES DAILY
Qty: 60 TABLET | Refills: 0 | OUTPATIENT
Start: 2024-01-30

## 2024-01-31 DIAGNOSIS — K21.00 GASTROESOPHAGEAL REFLUX DISEASE WITH ESOPHAGITIS WITHOUT HEMORRHAGE: ICD-10-CM

## 2024-01-31 DIAGNOSIS — M81.6 LOCALIZED OSTEOPOROSIS WITHOUT CURRENT PATHOLOGICAL FRACTURE: ICD-10-CM

## 2024-01-31 RX ORDER — FAMOTIDINE 20 MG/1
20 TABLET, FILM COATED ORAL 2 TIMES DAILY
Qty: 180 TABLET | Refills: 1 | Status: CANCELLED | OUTPATIENT
Start: 2024-01-31

## 2024-02-03 DIAGNOSIS — K21.00 GASTROESOPHAGEAL REFLUX DISEASE WITH ESOPHAGITIS WITHOUT HEMORRHAGE: ICD-10-CM

## 2024-02-06 NOTE — TELEPHONE ENCOUNTER
Please schedule Annual Physical    LOV: 3/3/23  Next OV: Return in about 3 months (around 6/3/2023) for annual physical and med check.   Last Refill:3/3/23    famotidine 20 MG Oral Tab 180 tablet 1 3/3/2023 --   Sig:   Take 1 tablet (20 mg total) by mouth 2 (two) times daily.

## 2024-02-08 RX ORDER — FAMOTIDINE 20 MG/1
20 TABLET, FILM COATED ORAL 2 TIMES DAILY
Qty: 60 TABLET | Refills: 0 | Status: SHIPPED | OUTPATIENT
Start: 2024-02-08

## 2024-02-08 RX ORDER — ALENDRONATE SODIUM 10 MG/1
10 TABLET ORAL DAILY
Qty: 30 TABLET | Refills: 0 | Status: SHIPPED | OUTPATIENT
Start: 2024-02-08

## 2024-03-07 DIAGNOSIS — M81.6 LOCALIZED OSTEOPOROSIS WITHOUT CURRENT PATHOLOGICAL FRACTURE: ICD-10-CM

## 2024-03-07 DIAGNOSIS — K21.00 GASTROESOPHAGEAL REFLUX DISEASE WITH ESOPHAGITIS WITHOUT HEMORRHAGE: ICD-10-CM

## 2024-03-07 RX ORDER — ALENDRONATE SODIUM 10 MG/1
10 TABLET ORAL DAILY
Qty: 30 TABLET | Refills: 0 | Status: SHIPPED | OUTPATIENT
Start: 2024-03-07

## 2024-03-07 NOTE — TELEPHONE ENCOUNTER
LOV: 3/3/24  Next OV: Return in about 3 months (around 6/3/2023) for annual physical and med check.   Last Refill:2/8/24    Requested Prescriptions     Pending Prescriptions Disp Refills    alendronate 10 MG Oral Tab 30 tablet 0     Sig: Take 1 tablet (10 mg total) by mouth daily.

## 2024-03-08 RX ORDER — FAMOTIDINE 20 MG/1
20 TABLET, FILM COATED ORAL 2 TIMES DAILY
Qty: 60 TABLET | Refills: 0 | Status: SHIPPED | OUTPATIENT
Start: 2024-03-08

## 2024-03-08 NOTE — TELEPHONE ENCOUNTER
LOV: 3/3/23  Next OV: Return in about 3 months (around 6/3/2023) for annual physical and med check.   Last Refill:2/8/24    Requested Prescriptions     Pending Prescriptions Disp Refills    famotidine 20 MG Oral Tab 60 tablet 0     Sig: Take 1 tablet (20 mg total) by mouth 2 (two) times daily.

## 2024-04-05 DIAGNOSIS — K21.00 GASTROESOPHAGEAL REFLUX DISEASE WITH ESOPHAGITIS WITHOUT HEMORRHAGE: ICD-10-CM

## 2024-04-05 DIAGNOSIS — M81.6 LOCALIZED OSTEOPOROSIS WITHOUT CURRENT PATHOLOGICAL FRACTURE: ICD-10-CM

## 2024-04-08 RX ORDER — ALENDRONATE SODIUM 10 MG/1
10 TABLET ORAL DAILY
Qty: 30 TABLET | Refills: 0 | Status: SHIPPED | OUTPATIENT
Start: 2024-04-08

## 2024-04-08 RX ORDER — FAMOTIDINE 20 MG/1
20 TABLET, FILM COATED ORAL 2 TIMES DAILY
Qty: 60 TABLET | Refills: 0 | Status: SHIPPED | OUTPATIENT
Start: 2024-04-08

## 2024-04-08 NOTE — TELEPHONE ENCOUNTER
LOV: 3/3/23  Next OV:  Return in about 3 months (around 6/3/2023) for annual physical and med check.   Last Refill:3/7/24 & 3/8/24    Requested Prescriptions     Pending Prescriptions Disp Refills    alendronate 10 MG Oral Tab 30 tablet 0     Sig: Take 1 tablet (10 mg total) by mouth daily.    famotidine 20 MG Oral Tab 60 tablet 0     Sig: Take 1 tablet (20 mg total) by mouth 2 (two) times daily.     No protocol    Please authorize if acceptable.   Thank you!

## 2024-05-08 DIAGNOSIS — M81.6 LOCALIZED OSTEOPOROSIS WITHOUT CURRENT PATHOLOGICAL FRACTURE: ICD-10-CM

## 2024-05-08 DIAGNOSIS — K21.00 GASTROESOPHAGEAL REFLUX DISEASE WITH ESOPHAGITIS WITHOUT HEMORRHAGE: ICD-10-CM

## 2024-05-10 NOTE — TELEPHONE ENCOUNTER
Last office visit: 3/3/2023   Labs last completed: 1/9/2024  Requested medication(s) are due for refill today: yes  Requested medication(s) are on the active medication list same strength, form, dose/ sig: yes  Requested medication(s) are managed by provider: yes  Patient has already received a courtsey refill: yes    NOV: none

## 2024-06-28 RX ORDER — FAMOTIDINE 20 MG/1
20 TABLET, FILM COATED ORAL 2 TIMES DAILY
Qty: 60 TABLET | Refills: 0 | OUTPATIENT
Start: 2024-06-28

## 2024-06-28 RX ORDER — ALENDRONATE SODIUM 10 MG/1
10 TABLET ORAL DAILY
Qty: 30 TABLET | Refills: 0 | OUTPATIENT
Start: 2024-06-28

## 2024-08-01 ENCOUNTER — LAB ENCOUNTER (OUTPATIENT)
Dept: LAB | Age: 64
End: 2024-08-01
Attending: FAMILY MEDICINE
Payer: COMMERCIAL

## 2024-08-01 ENCOUNTER — TELEPHONE (OUTPATIENT)
Dept: FAMILY MEDICINE CLINIC | Facility: CLINIC | Age: 64
End: 2024-08-01

## 2024-08-01 ENCOUNTER — OFFICE VISIT (OUTPATIENT)
Dept: FAMILY MEDICINE CLINIC | Facility: CLINIC | Age: 64
End: 2024-08-01
Payer: COMMERCIAL

## 2024-08-01 VITALS
SYSTOLIC BLOOD PRESSURE: 112 MMHG | HEART RATE: 74 BPM | BODY MASS INDEX: 19.2 KG/M2 | WEIGHT: 116.63 LBS | HEIGHT: 65.5 IN | RESPIRATION RATE: 16 BRPM | TEMPERATURE: 97 F | DIASTOLIC BLOOD PRESSURE: 70 MMHG

## 2024-08-01 DIAGNOSIS — Z12.31 ENCOUNTER FOR SCREENING MAMMOGRAM FOR MALIGNANT NEOPLASM OF BREAST: ICD-10-CM

## 2024-08-01 DIAGNOSIS — Z00.00 LABORATORY EXAMINATION ORDERED AS PART OF A ROUTINE GENERAL MEDICAL EXAMINATION: ICD-10-CM

## 2024-08-01 DIAGNOSIS — Z13.89 SCREENING FOR GENITOURINARY CONDITION: ICD-10-CM

## 2024-08-01 DIAGNOSIS — F17.200 NICOTINE DEPENDENCE, UNCOMPLICATED, UNSPECIFIED NICOTINE PRODUCT TYPE: ICD-10-CM

## 2024-08-01 DIAGNOSIS — F33.1 MODERATE EPISODE OF RECURRENT MAJOR DEPRESSIVE DISORDER (HCC): ICD-10-CM

## 2024-08-01 DIAGNOSIS — Z00.00 HEALTHCARE MAINTENANCE: ICD-10-CM

## 2024-08-01 DIAGNOSIS — K21.00 GASTROESOPHAGEAL REFLUX DISEASE WITH ESOPHAGITIS WITHOUT HEMORRHAGE: ICD-10-CM

## 2024-08-01 DIAGNOSIS — M81.6 LOCALIZED OSTEOPOROSIS WITHOUT CURRENT PATHOLOGICAL FRACTURE: ICD-10-CM

## 2024-08-01 DIAGNOSIS — Z13.820 SCREENING FOR OSTEOPOROSIS: ICD-10-CM

## 2024-08-01 DIAGNOSIS — Z12.11 SCREENING FOR MALIGNANT NEOPLASM OF COLON: ICD-10-CM

## 2024-08-01 LAB
ALBUMIN SERPL-MCNC: 4.5 G/DL (ref 3.2–4.8)
ALBUMIN/GLOB SERPL: 1.7 {RATIO} (ref 1–2)
ALP LIVER SERPL-CCNC: 88 U/L
ALT SERPL-CCNC: 9 U/L
ANION GAP SERPL CALC-SCNC: 6 MMOL/L (ref 0–18)
AST SERPL-CCNC: 17 U/L (ref ?–34)
BASOPHILS # BLD AUTO: 0.04 X10(3) UL (ref 0–0.2)
BASOPHILS NFR BLD AUTO: 0.5 %
BILIRUB SERPL-MCNC: 0.4 MG/DL (ref 0.2–1.1)
BILIRUB UR QL STRIP.AUTO: NEGATIVE
BUN BLD-MCNC: 8 MG/DL (ref 9–23)
CALCIUM BLD-MCNC: 9.7 MG/DL (ref 8.7–10.4)
CHLORIDE SERPL-SCNC: 105 MMOL/L (ref 98–112)
CHOLEST SERPL-MCNC: 204 MG/DL (ref ?–200)
CLARITY UR REFRACT.AUTO: CLEAR
CO2 SERPL-SCNC: 28 MMOL/L (ref 21–32)
CREAT BLD-MCNC: 0.75 MG/DL
EGFRCR SERPLBLD CKD-EPI 2021: 88 ML/MIN/1.73M2 (ref 60–?)
EOSINOPHIL # BLD AUTO: 0.03 X10(3) UL (ref 0–0.7)
EOSINOPHIL NFR BLD AUTO: 0.4 %
ERYTHROCYTE [DISTWIDTH] IN BLOOD BY AUTOMATED COUNT: 11.9 %
FASTING PATIENT LIPID ANSWER: YES
FASTING STATUS PATIENT QL REPORTED: YES
GLOBULIN PLAS-MCNC: 2.7 G/DL (ref 2–3.5)
GLUCOSE BLD-MCNC: 81 MG/DL (ref 70–99)
GLUCOSE UR STRIP.AUTO-MCNC: NORMAL MG/DL
HCT VFR BLD AUTO: 43 %
HDLC SERPL-MCNC: 45 MG/DL (ref 40–59)
HGB BLD-MCNC: 14.6 G/DL
IMM GRANULOCYTES # BLD AUTO: 0.02 X10(3) UL (ref 0–1)
IMM GRANULOCYTES NFR BLD: 0.2 %
KETONES UR STRIP.AUTO-MCNC: NEGATIVE MG/DL
LDLC SERPL CALC-MCNC: 138 MG/DL (ref ?–100)
LEUKOCYTE ESTERASE UR QL STRIP.AUTO: 75
LYMPHOCYTES # BLD AUTO: 2.61 X10(3) UL (ref 1–4)
LYMPHOCYTES NFR BLD AUTO: 31.9 %
MCH RBC QN AUTO: 32.2 PG (ref 26–34)
MCHC RBC AUTO-ENTMCNC: 34 G/DL (ref 31–37)
MCV RBC AUTO: 94.7 FL
MONOCYTES # BLD AUTO: 0.38 X10(3) UL (ref 0.1–1)
MONOCYTES NFR BLD AUTO: 4.7 %
NEUTROPHILS # BLD AUTO: 5.09 X10 (3) UL (ref 1.5–7.7)
NEUTROPHILS # BLD AUTO: 5.09 X10(3) UL (ref 1.5–7.7)
NEUTROPHILS NFR BLD AUTO: 62.3 %
NITRITE UR QL STRIP.AUTO: NEGATIVE
NONHDLC SERPL-MCNC: 159 MG/DL (ref ?–130)
OSMOLALITY SERPL CALC.SUM OF ELEC: 285 MOSM/KG (ref 275–295)
PH UR STRIP.AUTO: 6.5 [PH] (ref 5–8)
PLATELET # BLD AUTO: 280 10(3)UL (ref 150–450)
POTASSIUM SERPL-SCNC: 3.9 MMOL/L (ref 3.5–5.1)
PROT SERPL-MCNC: 7.2 G/DL (ref 5.7–8.2)
PROT UR STRIP.AUTO-MCNC: NEGATIVE MG/DL
RBC # BLD AUTO: 4.54 X10(6)UL
SODIUM SERPL-SCNC: 139 MMOL/L (ref 136–145)
SP GR UR STRIP.AUTO: 1.01 (ref 1–1.03)
TRIGL SERPL-MCNC: 118 MG/DL (ref 30–149)
TSI SER-ACNC: 2.56 MIU/ML (ref 0.55–4.78)
UROBILINOGEN UR STRIP.AUTO-MCNC: NORMAL MG/DL
VLDLC SERPL CALC-MCNC: 22 MG/DL (ref 0–30)
WBC # BLD AUTO: 8.2 X10(3) UL (ref 4–11)

## 2024-08-01 PROCEDURE — 80050 GENERAL HEALTH PANEL: CPT | Performed by: FAMILY MEDICINE

## 2024-08-01 PROCEDURE — 99214 OFFICE O/P EST MOD 30 MIN: CPT | Performed by: FAMILY MEDICINE

## 2024-08-01 PROCEDURE — 80061 LIPID PANEL: CPT | Performed by: FAMILY MEDICINE

## 2024-08-01 PROCEDURE — 3008F BODY MASS INDEX DOCD: CPT | Performed by: FAMILY MEDICINE

## 2024-08-01 PROCEDURE — 3074F SYST BP LT 130 MM HG: CPT | Performed by: FAMILY MEDICINE

## 2024-08-01 PROCEDURE — 81001 URINALYSIS AUTO W/SCOPE: CPT | Performed by: FAMILY MEDICINE

## 2024-08-01 PROCEDURE — 99397 PER PM REEVAL EST PAT 65+ YR: CPT | Performed by: FAMILY MEDICINE

## 2024-08-01 PROCEDURE — 3078F DIAST BP <80 MM HG: CPT | Performed by: FAMILY MEDICINE

## 2024-08-01 RX ORDER — FAMOTIDINE 20 MG/1
20 TABLET, FILM COATED ORAL 2 TIMES DAILY
Qty: 180 TABLET | Refills: 0 | Status: SHIPPED | OUTPATIENT
Start: 2024-08-01 | End: 2024-10-30

## 2024-08-01 RX ORDER — BUPROPION HYDROCHLORIDE 150 MG/1
150 TABLET ORAL DAILY
Qty: 30 TABLET | Refills: 1 | Status: SHIPPED | OUTPATIENT
Start: 2024-08-01

## 2024-08-01 RX ORDER — ALENDRONATE SODIUM 10 MG/1
10 TABLET ORAL DAILY
Qty: 90 TABLET | Refills: 0 | Status: SHIPPED | OUTPATIENT
Start: 2024-08-01 | End: 2024-10-30

## 2024-08-02 NOTE — PROGRESS NOTES
[facility] Family Medicine Office Note  Chief Complaint:   Chief Complaint   Patient presents with    Painful Urination     For about a week, burning when urinating. Took amox that she had at home, alleviated some symptoms.     Physical     Wants a different anti-depressant.        HPI:   This is a 63 year old female coming in for physical exam.  The patient states that she has been having some increased depressive symptoms.  She states that she has been following up with a therapist states that she feels that she needs something to help with her symptoms.  She has had some suicidal ideation but no plan.  Patient has a past medical history of osteopenia acid reflux anxiety      Past Medical History:    Celiac disease (HCC)    GERD (gastroesophageal reflux disease)     Past Surgical History:   Procedure Laterality Date    Cholecystectomy      Colposcopy, cervix w/upper adjacent vagina; w/biopsy(s), cervix      Cyst removal      Leep      Other surgical history      ACL    Other surgical history      minscus repair      Social History:  Social History     Socioeconomic History    Marital status: Single   Tobacco Use    Smoking status: Every Day     Types: Cigarettes     Start date: 9/27/2016    Smokeless tobacco: Never   Vaping Use    Vaping status: Never Used   Substance and Sexual Activity    Alcohol use: Not Currently    Drug use: Not Currently    Sexual activity: Not Currently   Other Topics Concern    Caffeine Concern Yes     Comment: 1 soda daily    Exercise No    Seat Belt Yes     Family History:  Family History   Problem Relation Age of Onset    Suicide History Son     Ovarian Cancer Maternal Grandmother      Allergies:  No Known Allergies  Current Meds:  Current Outpatient Medications   Medication Sig Dispense Refill    buPROPion  MG Oral Tablet 24 Hr Take 1 tablet (150 mg total) by mouth daily. 30 tablet 1    alendronate 10 MG Oral Tab Take 1 tablet (10 mg total) by mouth daily. 90 tablet 0     famotidine 20 MG Oral Tab Take 1 tablet (20 mg total) by mouth 2 (two) times daily. 180 tablet 0    LORazepam 0.5 MG Oral Tab Take 1 tablet (0.5 mg total) by mouth every 4 (four) hours as needed for Anxiety.        Ready to quit: Not Answered  Counseling given: Not Answered       REVIEW OF SYSTEMS:   Consitutional: No fevers, chills, sweats  Eye: No recent visual problems  ENMT: No ear pain nasal congestion sore throat  Respiratory: No shortness of breath, cough  Cardiovascular: No chest pain palpitations syncope  Gastrointestinal: No nausea vomiting diarrhea  Genitourinary: No hematuria  Hema/Lymph no bruising tendency, swollen lymph glands  Endocrine: Negative for excessive thirst excessive hunger  Musculoskeletal: No back pain neck pain joint pain muscle pain decreased range of motion  Integumentary: No rash, pruritus, abrasions  Neurologic: Alert and oriented x4  Psychiatric: Positive anxiety, depression    Medical, surgical, family, and social histories were reviewed      EXAM:   VITALS:   Vitals:    08/01/24 1201   BP: 112/70   Pulse: 74   Resp: 16   Temp: 97 °F (36.1 °C)      GENERAL: well developed, well nourished, in no apparent distress  SKIN: no rashes, no suspicious lesions: Cool and Dry  HEENT: atraumatic, normocephalic, ears and throat are clear    Ears: TM's clear and visible bilaterally, no excess cerumen or erythema.   EYES: Pupils equal round and reactive.  Extraocular motions intact no scleral icterus no injection or drainage  THROAT without erythema tonsillar hypertrophy or exudate.  Uvula midline airway patent  NECK: Given midline.  No JVD or lymphadenopathy supple nontender no meningeal signs   LUNGS: clear to auscultation sounds equal bilaterally no wheezes rales or rhonchi  CARDIO: Regular rate and rhythm without murmurs gallops or rubs  GI: Soft nontender nondistended no hepatomegaly palpable masses.  No guarding.   without deformity or crepitus no flank  tenderness  EXTREMITIES: no cyanosis, clubbing or edema no joint tenderness effusion or edema noted.  No calf tenderness negative Homans' sign bilaterally  NEURO: Awake and alert.  Cranial nerves II through XII intact motor and sensory grossly within normal limits.  5 out of 5 muscle strength in all muscle groups.  Normal speech.  PSYCHIATRIC: Awake, alert and oriented x3, cooperative appropriate mood and affect.  Judgment intact       ASSESSMENT AND PLAN:   1. Healthcare maintenance  I did discuss with the patient at this time would suggest for her to update blood work she need a CBC CMP free T4 free T3 TSH as well as lipid panel will also be placing orders for mammogram and DEXA scan as well as for her to follow-up with gastroenterology for screening colonoscopy.  I did discuss with her I would like for her to complete a CT of her lungs as well she does have a smoking history.  She was asked to follow-up with her primary care physician if she has any other concerns.  2. Moderate episode of recurrent major depressive disorder (HCC)  - buPROPion  MG Oral Tablet 24 Hr; Take 1 tablet (150 mg total) by mouth daily.  Dispense: 30 tablet; Refill: 1  Did discuss with the patient that I would suggest putting her on Wellbutrin she was asked to take this once a day and she was asked to continue with therapy as well as to follow-up with her primary care physician.  She denies any suicidal ideation or homicidal plan.  3. Laboratory examination ordered as part of a routine general medical examination  - CBC With Differential With Platelet; Future  - Comp Metabolic Panel (14); Future  - Lipid Panel; Future  - TSH W Reflex To Free T4; Future    4. Screening for genitourinary condition    5. Screening for osteoporosis  - XR DEXA BONE DENSITOMETRY (CPT=77080); Future    6. Encounter for screening mammogram for malignant neoplasm of breast  - [facility] 2D+3D SCREENING [facility](50218/38611); Future    7. Screening for  malignant neoplasm of colon  - [facility] - INTERNAL    8. Localized osteoporosis without current pathological fracture  - alendronate 10 MG Oral Tab; Take 1 tablet (10 mg total) by mouth daily.  Dispense: 90 tablet; Refill: 0    9. Gastroesophageal reflux disease with esophagitis without hemorrhage  - famotidine 20 MG Oral Tab; Take 1 tablet (20 mg total) by mouth 2 (two) times daily.  Dispense: 180 tablet; Refill: 0    10. Nicotine dependence, uncomplicated, unspecified nicotine product type  - CT LUNG LD SCREENING(CPT=71271); Future       Meds & Refills for this Visit:  Requested Prescriptions     Signed Prescriptions Disp Refills    buPROPion  MG Oral Tablet 24 Hr 30 tablet 1     Sig: Take 1 tablet (150 mg total) by mouth daily.    alendronate 10 MG Oral Tab 90 tablet 0     Sig: Take 1 tablet (10 mg total) by mouth daily.    famotidine 20 MG Oral Tab 180 tablet 0     Sig: Take 1 tablet (20 mg total) by mouth 2 (two) times daily.       Health Maintenance:  Health Maintenance Due   Topic Date Due    Colorectal Cancer Screening  Never done    Annual Physical  10/04/2022    Mammogram  11/01/2022    COVID-19 Vaccine (3 - 2023-24 season) 09/01/2023    DEXA Scan  12/05/2023    Tobacco Cessation Counseling  Never done    Pap Smear  10/04/2024       Patient/Caregiver Education: Patient/Caregiver Education: There are no barriers to learning. Medical education done.   Outcome: Patient verbalizes understanding. Patient is notified to call with any questions, complications, allergies, or worsening or changing symptoms.  Patient is to call with any side effects or complications from the treatments as a result of today.     Problem List:  Patient Active Problem List   Diagnosis    Weight loss    Severe episode of recurrent major depressive disorder, without psychotic features (HCC)    Selective IgA immunodeficiency (HCC)    Neck pain    Nasal septal deviation    Nasal obstruction    Malaise and fatigue    Low back pain     Localized osteoporosis without current pathological fracture    Lateral epicondylitis of elbow    Internal hemorrhoids    Insomnia    Current smoker    Benign essential microscopic hematuria    Generalized anxiety disorder    Gastroesophageal reflux disease with esophagitis without hemorrhage    Poor dentition    Personal history of colonic polyps    Abnormal glandular Papanicolaou smear of cervix         No follow-ups on file.     Rudolph Frye MD    Please note that portions of this note may have been completed with a voice recognition program. Efforts were made to edit the dictations but occasionally words are mis-transcribed.

## 2024-08-19 ENCOUNTER — TELEPHONE (OUTPATIENT)
Dept: FAMILY MEDICINE CLINIC | Facility: CLINIC | Age: 64
End: 2024-08-19

## 2024-08-19 NOTE — TELEPHONE ENCOUNTER
Patient needs an appointment to follow up. I recommend stopping the Wellbutrin and can follow up at [facility] next week. If any SI should go to ER. For acute worsening symptoms can also go to [facility] for acute evaluation if needed. Thank you.

## 2024-08-19 NOTE — TELEPHONE ENCOUNTER
Pt calling regarding:   buPROPion  MG Oral Tablet 24 Hr    Pt was prescribed during Last Office Visit 8/1/24.    Pt feels that this medication has greatly worsened her depression and would like to be switched to something different.    Pt expressed concerns regarding her appointment with Dr Frye, stating that she filled out health history form regarding her depression and that it was not reviewed by the provider.

## 2024-10-27 ENCOUNTER — HOSPITAL ENCOUNTER (EMERGENCY)
Age: 64
Discharge: HOME | End: 2024-10-27
Attending: EMERGENCY MEDICINE

## 2024-10-27 VITALS
OXYGEN SATURATION: 98 % | DIASTOLIC BLOOD PRESSURE: 50 MMHG | SYSTOLIC BLOOD PRESSURE: 112 MMHG | BODY MASS INDEX: 18 KG/M2 | HEART RATE: 67 BPM | HEIGHT: 66 IN | RESPIRATION RATE: 18 BRPM | TEMPERATURE: 97.7 F | WEIGHT: 112 LBS

## 2024-10-27 DIAGNOSIS — B02.8 HERPES ZOSTER WITH COMPLICATION: Primary | ICD-10-CM

## 2024-10-27 LAB
ALBUMIN SERPL-MCNC: 3.7 G/DL (ref 3.4–5)
ALBUMIN/GLOB SERPL: 1.1 {RATIO} (ref 1–2.4)
ALP SERPL-CCNC: 89 UNITS/L (ref 45–117)
ALT SERPL-CCNC: 11 UNITS/L
ANION GAP SERPL CALC-SCNC: 7 MMOL/L (ref 7–19)
AST SERPL-CCNC: 13 UNITS/L
BASOPHILS # BLD: 0 K/MCL (ref 0–0.3)
BASOPHILS NFR BLD: 1 %
BILIRUB SERPL-MCNC: 0.4 MG/DL (ref 0.2–1)
BUN SERPL-MCNC: 12 MG/DL (ref 6–20)
BUN/CREAT SERPL: 17 (ref 7–25)
CALCIUM SERPL-MCNC: 9.1 MG/DL (ref 8.4–10.2)
CHLORIDE SERPL-SCNC: 111 MMOL/L (ref 97–110)
CO2 SERPL-SCNC: 26 MMOL/L (ref 21–32)
CREAT SERPL-MCNC: 0.7 MG/DL (ref 0.51–0.95)
DEPRECATED RDW RBC: 43.5 FL (ref 39–50)
EGFRCR SERPLBLD CKD-EPI 2021: >90 ML/MIN/{1.73_M2}
EOSINOPHIL # BLD: 0 K/MCL (ref 0–0.5)
EOSINOPHIL NFR BLD: 0 %
ERYTHROCYTE [DISTWIDTH] IN BLOOD: 12.4 % (ref 11–15)
FASTING DURATION TIME PATIENT: ABNORMAL H
GLOBULIN SER-MCNC: 3.5 G/DL (ref 2–4)
GLUCOSE SERPL-MCNC: 94 MG/DL (ref 70–99)
HCT VFR BLD CALC: 40.8 % (ref 36–46.5)
HGB BLD-MCNC: 13.9 G/DL (ref 12–15.5)
IMM GRANULOCYTES # BLD AUTO: 0 K/MCL (ref 0–0.2)
IMM GRANULOCYTES # BLD: 0 %
LYMPHOCYTES # BLD: 1 K/MCL (ref 1–4)
LYMPHOCYTES NFR BLD: 19 %
MCH RBC QN AUTO: 32.8 PG (ref 26–34)
MCHC RBC AUTO-ENTMCNC: 34.1 G/DL (ref 32–36.5)
MCV RBC AUTO: 96.2 FL (ref 78–100)
MONOCYTES # BLD: 0.5 K/MCL (ref 0.3–0.9)
MONOCYTES NFR BLD: 8 %
NEUTROPHILS # BLD: 3.8 K/MCL (ref 1.8–7.7)
NEUTROPHILS NFR BLD: 72 %
NRBC BLD MANUAL-RTO: 0 /100 WBC
PLATELET # BLD AUTO: 244 K/MCL (ref 140–450)
POTASSIUM SERPL-SCNC: 3.8 MMOL/L (ref 3.4–5.1)
PROT SERPL-MCNC: 7.2 G/DL (ref 6.4–8.2)
RBC # BLD: 4.24 MIL/MCL (ref 4–5.2)
SODIUM SERPL-SCNC: 140 MMOL/L (ref 135–145)
WBC # BLD: 5.4 K/MCL (ref 4.2–11)

## 2024-10-27 PROCEDURE — 99283 EMERGENCY DEPT VISIT LOW MDM: CPT

## 2024-10-27 PROCEDURE — 99283 EMERGENCY DEPT VISIT LOW MDM: CPT | Performed by: EMERGENCY MEDICINE

## 2024-10-27 PROCEDURE — 85025 COMPLETE CBC W/AUTO DIFF WBC: CPT | Performed by: EMERGENCY MEDICINE

## 2024-10-27 PROCEDURE — 36415 COLL VENOUS BLD VENIPUNCTURE: CPT

## 2024-10-27 PROCEDURE — 80053 COMPREHEN METABOLIC PANEL: CPT | Performed by: EMERGENCY MEDICINE

## 2024-10-27 RX ORDER — PREDNISONE 50 MG/1
50 TABLET ORAL DAILY
Qty: 5 TABLET | Refills: 0 | Status: SHIPPED | OUTPATIENT
Start: 2024-10-27 | End: 2024-11-01

## 2024-10-27 RX ORDER — GABAPENTIN 100 MG/1
100 CAPSULE ORAL 3 TIMES DAILY
Qty: 15 CAPSULE | Refills: 0 | Status: SHIPPED | OUTPATIENT
Start: 2024-10-27 | End: 2024-11-01

## 2024-10-27 RX ORDER — VALACYCLOVIR HYDROCHLORIDE 1 G/1
1000 TABLET, FILM COATED ORAL 3 TIMES DAILY
Qty: 21 TABLET | Refills: 0 | Status: SHIPPED | OUTPATIENT
Start: 2024-10-27 | End: 2024-11-03

## 2024-11-04 DIAGNOSIS — M81.6 LOCALIZED OSTEOPOROSIS WITHOUT CURRENT PATHOLOGICAL FRACTURE: ICD-10-CM

## 2024-11-06 RX ORDER — ALENDRONATE SODIUM 10 MG/1
10 TABLET ORAL DAILY
Qty: 90 TABLET | Refills: 0 | Status: SHIPPED | OUTPATIENT
Start: 2024-11-06

## 2024-11-10 DIAGNOSIS — K21.00 GASTROESOPHAGEAL REFLUX DISEASE WITH ESOPHAGITIS WITHOUT HEMORRHAGE: ICD-10-CM

## 2024-11-10 RX ORDER — FAMOTIDINE 20 MG/1
20 TABLET, FILM COATED ORAL 2 TIMES DAILY
Qty: 180 TABLET | Refills: 0 | OUTPATIENT
Start: 2024-11-10

## 2024-11-11 NOTE — TELEPHONE ENCOUNTER
FAMOTIDINE 20MG TABLETS     Please see pended medications.    Please sign if appropriate.      Thank you      Last OV: 08/01/2024      Last refill: 08/01/2024 for 180 tabs

## 2024-12-09 DIAGNOSIS — K21.00 GASTROESOPHAGEAL REFLUX DISEASE WITH ESOPHAGITIS WITHOUT HEMORRHAGE: ICD-10-CM

## 2024-12-09 RX ORDER — FAMOTIDINE 20 MG/1
20 TABLET, FILM COATED ORAL 2 TIMES DAILY
Qty: 180 TABLET | Refills: 0 | Status: SHIPPED | OUTPATIENT
Start: 2024-12-09 | End: 2025-03-09

## 2024-12-09 NOTE — TELEPHONE ENCOUNTER
LOV:08/01/2024      NOV:12/13/2024    Medication:   Alendronate was filled on 11/06/2024 for # 90.   Famotidine 20 mg 1 daily

## 2024-12-09 NOTE — TELEPHONE ENCOUNTER
Patient requesting refill for    ALENDRONATE 10 MG Oral Tab     famotidine 20 MG Oral Tab     [id number] - [place] [address] AT [facility] [address], [phone], [phone]

## 2024-12-21 ENCOUNTER — MED REC SCAN ONLY (OUTPATIENT)
Dept: FAMILY MEDICINE CLINIC | Facility: CLINIC | Age: 64
End: 2024-12-21

## (undated) DIAGNOSIS — F41.9 ANXIETY AND DEPRESSION: ICD-10-CM

## (undated) DIAGNOSIS — F32.A ANXIETY AND DEPRESSION: ICD-10-CM

## (undated) NOTE — LETTER
Rossy Dumont, :1960    CONSENT FOR PROCEDURE/SEDATION    1. I authorize the performance upon Rossy Dumont  the following: Colposcopy with biopsy and Endocervical curettage and Endometrial Biopsy     2.  I authorize Dr. Stefani Alfaro to patient: ____________________________________________    Witness: _________________________________________ Date:___________     Physician Signature: _______________________________ Date:___________